# Patient Record
Sex: MALE | Race: WHITE
[De-identification: names, ages, dates, MRNs, and addresses within clinical notes are randomized per-mention and may not be internally consistent; named-entity substitution may affect disease eponyms.]

---

## 2018-03-17 ENCOUNTER — HOSPITAL ENCOUNTER (EMERGENCY)
Dept: HOSPITAL 45 - C.EDB | Age: 23
Discharge: HOME | End: 2018-03-17
Payer: COMMERCIAL

## 2018-03-17 VITALS
HEIGHT: 67.99 IN | BODY MASS INDEX: 33.88 KG/M2 | WEIGHT: 223.55 LBS | HEIGHT: 67.99 IN | WEIGHT: 223.55 LBS | BODY MASS INDEX: 33.88 KG/M2

## 2018-03-17 VITALS — DIASTOLIC BLOOD PRESSURE: 94 MMHG | OXYGEN SATURATION: 95 % | HEART RATE: 101 BPM | SYSTOLIC BLOOD PRESSURE: 142 MMHG

## 2018-03-17 VITALS — TEMPERATURE: 97.88 F

## 2018-03-17 DIAGNOSIS — R00.2: Primary | ICD-10-CM

## 2018-03-17 LAB
ALBUMIN SERPL-MCNC: 4.2 GM/DL (ref 3.4–5)
ALP SERPL-CCNC: 94 U/L (ref 45–117)
ALT SERPL-CCNC: 69 U/L (ref 12–78)
AST SERPL-CCNC: 37 U/L (ref 15–37)
BASOPHILS # BLD: 0.02 K/UL (ref 0–0.2)
BASOPHILS NFR BLD: 0.3 %
BUN SERPL-MCNC: 12 MG/DL (ref 7–18)
CALCIUM SERPL-MCNC: 9 MG/DL (ref 8.5–10.1)
CO2 SERPL-SCNC: 26 MMOL/L (ref 21–32)
CREAT SERPL-MCNC: 0.81 MG/DL (ref 0.6–1.4)
EOS ABS #: 0.21 K/UL (ref 0–0.5)
EOSINOPHIL NFR BLD AUTO: 303 K/UL (ref 130–400)
GLUCOSE SERPL-MCNC: 115 MG/DL (ref 70–99)
HCT VFR BLD CALC: 40.9 % (ref 42–52)
HGB BLD-MCNC: 14.9 G/DL (ref 14–18)
IG#: 0.01 K/UL (ref 0–0.02)
IMM GRANULOCYTES NFR BLD AUTO: 38.7 %
LYMPHOCYTES # BLD: 2.38 K/UL (ref 1.2–3.4)
MCH RBC QN AUTO: 32.2 PG (ref 25–34)
MCHC RBC AUTO-ENTMCNC: 36.4 G/DL (ref 32–36)
MCV RBC AUTO: 88.3 FL (ref 80–100)
MONO ABS #: 0.46 K/UL (ref 0.11–0.59)
MONOCYTES NFR BLD: 7.5 %
NEUT ABS #: 3.07 K/UL (ref 1.4–6.5)
NEUTROPHILS # BLD AUTO: 3.4 %
NEUTROPHILS NFR BLD AUTO: 49.9 %
PMV BLD AUTO: 9.5 FL (ref 7.4–10.4)
POTASSIUM SERPL-SCNC: 3.3 MMOL/L (ref 3.5–5.1)
PROT SERPL-MCNC: 7.9 GM/DL (ref 6.4–8.2)
RED CELL DISTRIBUTION WIDTH CV: 11.9 % (ref 11.5–14.5)
RED CELL DISTRIBUTION WIDTH SD: 38.2 FL (ref 36.4–46.3)
SODIUM SERPL-SCNC: 136 MMOL/L (ref 136–145)
WBC # BLD AUTO: 6.15 K/UL (ref 4.8–10.8)

## 2018-03-17 NOTE — EMERGENCY ROOM VISIT NOTE
History


First contact with patient:  03:12


Chief Complaint:  CHEST PAIN


Stated Complaint:  STROKE SYMPTOMS


Nursing Triage Summary:  


patient states one hour prior to arrival while sitting up and eating patient 


developed chest pain to left side of chest along with feeling of numbness / 


discomfort.





History of Present Illness


The patient is a 22 year old male who presents to the Emergency Room with 

complaints of palpitations that began spontaneously about 1 hour ago.  The 

patient states that he was seated in his room and had just finished eating an 

apple at the onset of symptoms.  The patient does not report injury or trauma.  

No recent travel history.  He has not been ill recently with fevers, cough, or 

URI symptoms.  The patient does not have episodes of this in the past.  He 

denies drug and alcohol use.  No abdominal pain.  He rates his current 

discomfort a 5/10.





Review of Systems


More than 10 systems were reviewed and otherwise negative with the exception of 

history of present illness.





Past Medical/Surgical History


No chronic medical disease





Family History


No pertinent family history





Social History


Smoking Status:  Never Smoker


Drug Use:  marijuana


Occupation Status:  [employer] student





Current/Historical Medications


No Active Prescriptions or Reported Meds





Physical Exam


Vital Signs











  Date Time  Temp Pulse Resp B/P (MAP) Pulse Ox O2 Delivery O2 Flow Rate FiO2


 


3/17/18 06:38  101 16 142/94 95   


 


3/17/18 05:01  117 16 136/94    


 


3/17/18 04:05  110 15 132/89    


 


3/17/18 03:34      Room Air  


 


3/17/18 03:25  136      


 


3/17/18 03:20     98 Room Air  


 


3/17/18 03:09 36.6 133 18 148/97 97 Room Air  











Physical Exam


VITALS: Vitals are noted on the nurse's note and reviewed by myself.  Vital 

signs stable.


GENERAL: Well-developed, well-nourished, male who appears very anxious on 

examination. 


EARS: External ear normal. External auditory canals clear, tympanic membranes 

pearly gray without erythema or effusion bilaterally.


EYES: Pupils equal round and reactive to light and accommodation.  Conjunctivae 

without injection, sclerae without icterus.  Extraocular movements intact.  


NOSE: Patent, turbinates without inflammation or discharge.  


MOUTH: Mucous membranes moist.  Tonsils are not enlarged.  Pharynx without 

erythema, blood, or exudate.  Uvula midline.  Airway patent.   


NECK: Supple without nuchal rigidity.  No lymphadenopathy.  No thyromegaly.  

Cervical spine is nontender.  


HEART: Tachycardic rate with regular rhythm


LUNGS: Clear to auscultation bilaterally without wheezes, rales or rhonchi.  No 

retractions or accessory muscle use. 


MUSCULOSKELETAL: No muscle atrophy, erythema, or edema noted.  No calf 

tenderness





Medical Decision & Procedures


ER Provider


Diagnostic Interpretation:











CHEST 2 VIEWS ROUTINE





CLINICAL HISTORY: Palpitations cardiac arrhythmia





COMPARISON STUDY:  No previous studies for comparison.





FINDINGS: The bones soft tissues and hemidiaphragms are normal. The


cardiomediastinal silhouette is normal. The lungs are clear. The pulmonary


vasculature is normal. 





IMPRESSION:  Negative chest. 











Laboratory Results


3/17/18 03:28








Red Blood Count 4.63, Mean Corpuscular Volume 88.3, Mean Corpuscular Hemoglobin 

32.2, Mean Corpuscular Hemoglobin Concent 36.4, Mean Platelet Volume 9.5, 

Neutrophils (%) (Auto) 49.9, Lymphocytes (%) (Auto) 38.7, Monocytes (%) (Auto) 

7.5, Eosinophils (%) (Auto) 3.4, Basophils (%) (Auto) 0.3, Neutrophils # (Auto) 

3.07, Lymphocytes # (Auto) 2.38, Monocytes # (Auto) 0.46, Eosinophils # (Auto) 

0.21, Basophils # (Auto) 0.02





3/17/18 03:28

















Test


  3/17/18


00:00 3/17/18


03:28 3/17/18


03:33


 


Urine Color YELLOW   


 


Urine Appearance CLEAR (CLEAR)   


 


Urine pH 6.5 (4.5-7.5)   


 


Urine Specific Gravity


  1.004


(1.000-1.030) 


  


 


 


Urine Protein NEG (NEG)   


 


Urine Glucose (UA) NEG (NEG)   


 


Urine Ketones NEG (NEG)   


 


Urine Occult Blood NEG (NEG)   


 


Urine Nitrite NEG (NEG)   


 


Urine Bilirubin NEG (NEG)   


 


Urine Urobilinogen NEG (NEG)   


 


Urine Leukocyte Esterase NEG (NEG)   


 


Urine Opiates Screen NEG (NEG)   


 


Urine Methadone, Qualitative NEG (NEG)   


 


Urine Barbiturates NEG (NEG)   


 


Urine Phencyclidine (PCP)


Level NEG (NEG) 


  


  


 


 


Ur


Amphetamine/Methamphetamine NEG (NEG) 


  


  


 


 


MDMA (Ecstasy) Screen NEG (NEG)   


 


Urine Benzodiazepines Screen NEG (NEG)   


 


Urine Cocaine Metabolite NEG (NEG)   


 


Urine Marijuana (THC) POS (NEG)   


 


White Blood Count


  


  6.15 K/uL


(4.8-10.8) 


 


 


Red Blood Count


  


  4.63 M/uL


(4.7-6.1) 


 


 


Hemoglobin


  


  14.9 g/dL


(14.0-18.0) 


 


 


Hematocrit  40.9 % (42-52)  


 


Mean Corpuscular Volume


  


  88.3 fL


() 


 


 


Mean Corpuscular Hemoglobin


  


  32.2 pg


(25-34) 


 


 


Mean Corpuscular Hemoglobin


Concent 


  36.4 g/dl


(32-36) 


 


 


Platelet Count


  


  303 K/uL


(130-400) 


 


 


Mean Platelet Volume


  


  9.5 fL


(7.4-10.4) 


 


 


Neutrophils (%) (Auto)  49.9 %  


 


Lymphocytes (%) (Auto)  38.7 %  


 


Monocytes (%) (Auto)  7.5 %  


 


Eosinophils (%) (Auto)  3.4 %  


 


Basophils (%) (Auto)  0.3 %  


 


Neutrophils # (Auto)


  


  3.07 K/uL


(1.4-6.5) 


 


 


Lymphocytes # (Auto)


  


  2.38 K/uL


(1.2-3.4) 


 


 


Monocytes # (Auto)


  


  0.46 K/uL


(0.11-0.59) 


 


 


Eosinophils # (Auto)


  


  0.21 K/uL


(0-0.5) 


 


 


Basophils # (Auto)


  


  0.02 K/uL


(0-0.2) 


 


 


RDW Standard Deviation


  


  38.2 fL


(36.4-46.3) 


 


 


RDW Coefficient of Variation


  


  11.9 %


(11.5-14.5) 


 


 


Immature Granulocyte % (Auto)  0.2 %  


 


Immature Granulocyte # (Auto)


  


  0.01 K/uL


(0.00-0.02) 


 


 


Anion Gap


  


  10.0 mmol/L


(3-11) 


 


 


Est Creatinine Clear Calc


Drug Dose 


  166.5 ml/min 


  


 


 


Estimated GFR (


American) 


  147.2 


  


 


 


Estimated GFR (Non-


American 


  127.0 


  


 


 


BUN/Creatinine Ratio  15.0 (10-20)  


 


Calcium Level


  


  9.0 mg/dl


(8.5-10.1) 


 


 


Magnesium Level


  


  2.0 mg/dl


(1.8-2.4) 


 


 


Total Bilirubin


  


  0.4 mg/dl


(0.2-1) 


 


 


Aspartate Amino Transf


(AST/SGOT) 


  37 U/L (15-37) 


  


 


 


Alanine Aminotransferase


(ALT/SGPT) 


  69 U/L (12-78) 


  


 


 


Alkaline Phosphatase


  


  94 U/L


() 


 


 


Total Protein


  


  7.9 gm/dl


(6.4-8.2) 


 


 


Albumin


  


  4.2 gm/dl


(3.4-5.0) 


 


 


Globulin


  


  3.7 gm/dl


(2.5-4.0) 


 


 


Albumin/Globulin Ratio  1.1 (0.9-2)  


 


Thyroid Stimulating Hormone


(TSH) 


  1.360 uIu/ml


(0.300-4.500) 


 


 


Lyme Disease IgG Antibody  NEG (NEG)  


 


Lyme Disease IgM Antibody  NEG (NEG)  


 


Bedside D-Dimer


  


  


  48 ng/mlFEU


(0-450)


 


Bedside Troponin I


  


  


  < 0.030 ng/ml


(0-0.045)











Medications Administered











 Medications


  (Trade)  Dose


 Ordered  Sig/Oli


 Route  Start Time


 Stop Time Status Last Admin


Dose Admin


 


 Sodium Chloride  1,000 ml @ 


 999 mls/hr  Q1H1M ONCE


 IV  3/17/18 03:30


 3/17/18 04:30 DC 3/17/18 03:41


999 MLS/HR


 


 Lorazepam


  (Ativan Inj)  1 mg  NOW  STAT


 IV  3/17/18 03:20


 3/17/18 03:22 DC 3/17/18 03:39


1 MG


 


 Sodium Chloride  1,000 ml @ 


 999 mls/hr  Q1H1M ONCE


 IV  3/17/18 05:00


 3/17/18 06:00 DC 3/17/18 04:59


999 MLS/HR











ED Course


Physical exam and history were performed. Nursing notes, EMR, and Medication 

List were personally reviewed. 





Patient appears to have palpitation symptoms that began within the last hour.  

On examination the patient is anxious and does have an elevated heart rate.   

EKG was performed that was sinus tachycardia at 136 bpm without acute ST 

elevation or ischemia.  IV access was established and labs were obtained.  The 

patient was hydrated with normal saline and given IV Ativan.  Chest x-ray was 

performed.  The patient was placed on a cardiac monitor. 





The patient's blood work is as above and was reviewed.  He does not have a 

significantly elevated white blood cell count, gross anemia,  bandemia, or 

significant electrolyte imbalance.  Lipase and transaminases are not 

diagnostic.  Troponin and d-dimer are both negative.  TSH is euthyroid.  

Patient remained persistently tachycardic on the cardiac monitor, however this 

did come down to about 100 bpm after 2 L of fluids.  The patient's drug of 

abuse screen did return positive for marijuana. 





Overall the patient remained in stable condition and felt well on reevaluation.

  I suspect that many of his symptoms are related to smoking marijuana and 

possibly some dehydration.  He does not appear to have an acute cardiopulmonary 

event at this time.  I discussed options of care with the patient, and he does 

feel well for discharge home.  I recommended that he follow with S in the 

next few days for a recheck.  He may be a good candidate for Holter monitor if 

his symptoms persist.  He was otherwise invited back to the ER with any new, 

worsening, or concerning symptoms.





The chart was completed utilizing Dragon Speech Voice Recognition Software. 

Grammatical errors, random word insertions, pronoun errors, and incomplete 

sentences are an occasional consequence of this system due to software 

limitations, ambient noise, and hardware issues. Any formal questions or 

concerns about the content, text, or information contained within the body of 

this dictation should be directly addressed to the provider for clarification.


.





Medical Decision


Differential diagnosis:


Etiologies such as premature contractions, electrolyte abnormality, cardiac 

dysrhythmia, thyroid dysfunction, pulmonary embolism, infection, 

gastrointestinal, as well as others were entertained.





Impression





 Primary Impression:  


 Heart palpitations





Departure Information


Dispostion


Home / Self-Care





Condition


GOOD





Prescriptions





No Active Prescriptions or Reported Meds





Forms


HOME CARE DOCUMENTATION FORM,                                                 

               IMPORTANT VISIT INFORMATION





Patient Instructions


My [facility]





Additional Instructions





You were seen and evaluated today on an emergency basis only.  This is not a 

substitute for, or an effort to provide, complete comprehensive medical care.  

It is not possible to recognize and treat all injuries or illnesses in a single 

emergency department visit. 





For this reason it is recommended that you followup with S next week for a 

recheck of your condition.





Drink plenty of fluids and remain well hydrated. 





You are welcome to return to the emergency department anytime with new, 

worsening, or concerning symptoms.

## 2018-03-21 ENCOUNTER — HOSPITAL ENCOUNTER (OUTPATIENT)
Dept: HOSPITAL 45 - C.LABSPEC | Age: 23
Discharge: HOME | End: 2018-03-21
Attending: FAMILY MEDICINE
Payer: COMMERCIAL

## 2018-03-21 DIAGNOSIS — R07.9: Primary | ICD-10-CM

## 2018-03-21 LAB — CK MB SERPL-MCNC: 0.9 NG/ML (ref 0.5–3.6)

## 2024-03-29 ENCOUNTER — NEW PATIENT COMPREHENSIVE (OUTPATIENT)
Dept: URBAN - METROPOLITAN AREA CLINIC 63 | Facility: CLINIC | Age: 29
End: 2024-03-29

## 2024-03-29 DIAGNOSIS — H52.12: ICD-10-CM

## 2024-03-29 PROCEDURE — 92015 DETERMINE REFRACTIVE STATE: CPT

## 2024-03-29 PROCEDURE — 92004 COMPRE OPH EXAM NEW PT 1/>: CPT

## 2024-03-29 ASSESSMENT — KERATOMETRY
OS_K1POWER_DIOPTERS: 41.25
OS_AXISANGLE2_DEGREES: 79
OD_AXISANGLE2_DEGREES: 82
OD_AXISANGLE_DEGREES: 172
OD_K2POWER_DIOPTERS: 42.25
OD_K1POWER_DIOPTERS: 41.50
OS_K2POWER_DIOPTERS: 42
OS_AXISANGLE_DEGREES: 169

## 2024-03-29 ASSESSMENT — VISUAL ACUITY
OD_SC: 20/20
OS_SC: 20/25

## 2024-03-29 ASSESSMENT — TONOMETRY
OD_IOP_MMHG: 10
OS_IOP_MMHG: 11